# Patient Record
Sex: FEMALE | Race: WHITE
[De-identification: names, ages, dates, MRNs, and addresses within clinical notes are randomized per-mention and may not be internally consistent; named-entity substitution may affect disease eponyms.]

---

## 2019-02-27 ENCOUNTER — HOSPITAL ENCOUNTER (INPATIENT)
Dept: HOSPITAL 88 - ER | Age: 64
LOS: 5 days | Discharge: HOME | DRG: 603 | End: 2019-03-04
Attending: INTERNAL MEDICINE | Admitting: INTERNAL MEDICINE
Payer: COMMERCIAL

## 2019-02-27 VITALS — HEIGHT: 64 IN | BODY MASS INDEX: 50.02 KG/M2 | WEIGHT: 293 LBS

## 2019-02-27 DIAGNOSIS — I87.309: ICD-10-CM

## 2019-02-27 DIAGNOSIS — I89.0: ICD-10-CM

## 2019-02-27 DIAGNOSIS — E78.5: ICD-10-CM

## 2019-02-27 DIAGNOSIS — G57.10: ICD-10-CM

## 2019-02-27 DIAGNOSIS — Z79.82: ICD-10-CM

## 2019-02-27 DIAGNOSIS — L03.115: Primary | ICD-10-CM

## 2019-02-27 DIAGNOSIS — Z79.84: ICD-10-CM

## 2019-02-27 DIAGNOSIS — I25.10: ICD-10-CM

## 2019-02-27 DIAGNOSIS — E03.9: ICD-10-CM

## 2019-02-27 DIAGNOSIS — G47.30: ICD-10-CM

## 2019-02-27 DIAGNOSIS — Z95.1: ICD-10-CM

## 2019-02-27 DIAGNOSIS — E87.1: ICD-10-CM

## 2019-02-27 DIAGNOSIS — N18.3: ICD-10-CM

## 2019-02-27 DIAGNOSIS — I50.32: ICD-10-CM

## 2019-02-27 DIAGNOSIS — E87.6: ICD-10-CM

## 2019-02-27 DIAGNOSIS — I13.0: ICD-10-CM

## 2019-02-27 DIAGNOSIS — E11.22: ICD-10-CM

## 2019-02-27 DIAGNOSIS — N17.9: ICD-10-CM

## 2019-02-27 DIAGNOSIS — N39.0: ICD-10-CM

## 2019-02-27 DIAGNOSIS — E66.01: ICD-10-CM

## 2019-02-27 DIAGNOSIS — D63.1: ICD-10-CM

## 2019-02-27 DIAGNOSIS — R53.81: ICD-10-CM

## 2019-02-27 PROCEDURE — 71045 X-RAY EXAM CHEST 1 VIEW: CPT

## 2019-02-27 PROCEDURE — 81001 URINALYSIS AUTO W/SCOPE: CPT

## 2019-02-27 PROCEDURE — 82728 ASSAY OF FERRITIN: CPT

## 2019-02-27 PROCEDURE — 85610 PROTHROMBIN TIME: CPT

## 2019-02-27 PROCEDURE — 84466 ASSAY OF TRANSFERRIN: CPT

## 2019-02-27 PROCEDURE — 93925 LOWER EXTREMITY STUDY: CPT

## 2019-02-27 PROCEDURE — 85025 COMPLETE CBC W/AUTO DIFF WBC: CPT

## 2019-02-27 PROCEDURE — 83605 ASSAY OF LACTIC ACID: CPT

## 2019-02-27 PROCEDURE — 36415 COLL VENOUS BLD VENIPUNCTURE: CPT

## 2019-02-27 PROCEDURE — 82550 ASSAY OF CK (CPK): CPT

## 2019-02-27 PROCEDURE — 82948 REAGENT STRIP/BLOOD GLUCOSE: CPT

## 2019-02-27 PROCEDURE — 82570 ASSAY OF URINE CREATININE: CPT

## 2019-02-27 PROCEDURE — 84156 ASSAY OF PROTEIN URINE: CPT

## 2019-02-27 PROCEDURE — 93005 ELECTROCARDIOGRAM TRACING: CPT

## 2019-02-27 PROCEDURE — 87040 BLOOD CULTURE FOR BACTERIA: CPT

## 2019-02-27 PROCEDURE — 83735 ASSAY OF MAGNESIUM: CPT

## 2019-02-27 PROCEDURE — 76770 US EXAM ABDO BACK WALL COMP: CPT

## 2019-02-27 PROCEDURE — 80053 COMPREHEN METABOLIC PANEL: CPT

## 2019-02-27 PROCEDURE — 84484 ASSAY OF TROPONIN QUANT: CPT

## 2019-02-27 PROCEDURE — 80048 BASIC METABOLIC PNL TOTAL CA: CPT

## 2019-02-27 PROCEDURE — 93306 TTE W/DOPPLER COMPLETE: CPT

## 2019-02-27 PROCEDURE — 85730 THROMBOPLASTIN TIME PARTIAL: CPT

## 2019-02-27 PROCEDURE — 83880 ASSAY OF NATRIURETIC PEPTIDE: CPT

## 2019-02-27 PROCEDURE — 82553 CREATINE MB FRACTION: CPT

## 2019-02-27 PROCEDURE — 93971 EXTREMITY STUDY: CPT

## 2019-02-27 PROCEDURE — 87086 URINE CULTURE/COLONY COUNT: CPT

## 2019-02-27 PROCEDURE — 99284 EMERGENCY DEPT VISIT MOD MDM: CPT

## 2019-02-27 PROCEDURE — 83540 ASSAY OF IRON: CPT

## 2019-02-27 PROCEDURE — 84443 ASSAY THYROID STIM HORMONE: CPT

## 2019-02-28 VITALS — DIASTOLIC BLOOD PRESSURE: 49 MMHG | SYSTOLIC BLOOD PRESSURE: 114 MMHG

## 2019-02-28 VITALS — DIASTOLIC BLOOD PRESSURE: 51 MMHG | SYSTOLIC BLOOD PRESSURE: 113 MMHG

## 2019-02-28 VITALS — DIASTOLIC BLOOD PRESSURE: 54 MMHG | SYSTOLIC BLOOD PRESSURE: 101 MMHG

## 2019-02-28 VITALS — SYSTOLIC BLOOD PRESSURE: 114 MMHG | DIASTOLIC BLOOD PRESSURE: 49 MMHG

## 2019-02-28 VITALS — SYSTOLIC BLOOD PRESSURE: 127 MMHG | DIASTOLIC BLOOD PRESSURE: 58 MMHG

## 2019-02-28 LAB
ALBUMIN SERPL-MCNC: 3.1 G/DL (ref 3.5–5)
ALBUMIN/GLOB SERPL: 0.9 {RATIO} (ref 0.8–2)
ALP SERPL-CCNC: 23 IU/L (ref 40–150)
ALT SERPL-CCNC: 16 IU/L (ref 0–55)
ANION GAP SERPL CALC-SCNC: 13.1 MMOL/L (ref 8–16)
BACTERIA URNS QL MICRO: (no result) /HPF
BASOPHILS # BLD AUTO: 0 10*3/UL (ref 0–0.1)
BASOPHILS NFR BLD AUTO: 0.2 % (ref 0–1)
BILIRUB UR QL: NEGATIVE
BNP BLD-MCNC: 415.3 PG/ML (ref 0–100)
BUN SERPL-MCNC: 39 MG/DL (ref 7–26)
BUN/CREAT SERPL: 21 (ref 6–25)
CALCIUM SERPL-MCNC: 9.6 MG/DL (ref 8.4–10.2)
CHLORIDE SERPL-SCNC: 97 MMOL/L (ref 98–107)
CK MB SERPL-MCNC: 0.3 NG/ML (ref 0–5)
CK MB SERPL-MCNC: 0.6 NG/ML (ref 0–5)
CK MB SERPL-MCNC: 0.6 NG/ML (ref 0–5)
CK SERPL-CCNC: 146 IU/L (ref 29–168)
CK SERPL-CCNC: 206 IU/L (ref 29–168)
CK SERPL-CCNC: 219 IU/L (ref 29–168)
CLARITY UR: (no result)
CO2 SERPL-SCNC: 23 MMOL/L (ref 22–29)
COLOR UR: YELLOW
CREAT UR-MCNC: 115.84 MG/DL (ref 47–110)
DEPRECATED APTT PLAS QN: 34.8 SECONDS (ref 23.8–35.5)
DEPRECATED INR PLAS: 1.09
DEPRECATED NEUTROPHILS # BLD AUTO: 6.8 10*3/UL (ref 2.1–6.9)
DEPRECATED RBC URNS MANUAL-ACNC: (no result) /HPF (ref 0–5)
EGFRCR SERPLBLD CKD-EPI 2021: 27 ML/MIN (ref 60–?)
EOSINOPHIL # BLD AUTO: 0 10*3/UL (ref 0–0.4)
EOSINOPHIL NFR BLD AUTO: 0.1 % (ref 0–6)
EPI CELLS URNS QL MICRO: (no result) /LPF
ERYTHROCYTE [DISTWIDTH] IN CORD BLOOD: 15.4 % (ref 11.7–14.4)
GLOBULIN PLAS-MCNC: 3.4 G/DL (ref 2.3–3.5)
GLUCOSE SERPLBLD-MCNC: 133 MG/DL (ref 74–118)
HCT VFR BLD AUTO: 24.2 % (ref 34.2–44.1)
HGB BLD-MCNC: 8.1 G/DL (ref 12–16)
HYALINE CASTS #/AREA URNS LPF: (no result) /[LPF] (ref 0–1)
KETONES UR QL STRIP.AUTO: NEGATIVE
LEUKOCYTE ESTERASE UR QL STRIP.AUTO: (no result)
LYMPHOCYTES # BLD: 0.6 10*3/UL (ref 1–3.2)
LYMPHOCYTES NFR BLD AUTO: 6.8 % (ref 18–39.1)
MAGNESIUM SERPL-MCNC: 1.6 MG/DL (ref 1.3–2.1)
MCH RBC QN AUTO: 31.6 PG (ref 28–32)
MCHC RBC AUTO-ENTMCNC: 33.5 G/DL (ref 31–35)
MCV RBC AUTO: 94.5 FL (ref 81–99)
MONOCYTES # BLD AUTO: 0.7 10*3/UL (ref 0.2–0.8)
MONOCYTES NFR BLD AUTO: 8.9 % (ref 4.4–11.3)
MUCOUS THREADS URNS QL MICRO: (no result)
NEUTS SEG NFR BLD AUTO: 83.3 % (ref 38.7–80)
NITRITE UR QL STRIP.AUTO: NEGATIVE
NON-SQ EPI CELLS URNS QL MICRO: (no result)
PLATELET # BLD AUTO: 109 X10E3/UL (ref 140–360)
POTASSIUM SERPL-SCNC: 3.1 MMOL/L (ref 3.5–5.1)
PROT UR QL STRIP.AUTO: NEGATIVE
PROT UR-MCNC: 15.1 MG/DL (ref 1–14)
PROTHROMBIN TIME: 15.1 SECONDS (ref 11.9–14.5)
RBC # BLD AUTO: 2.56 X10E6/UL (ref 3.6–5.1)
RENAL EPI CELLS URNS QL MICRO: (no result)
SODIUM SERPL-SCNC: 130 MMOL/L (ref 136–145)
SP GR UR STRIP: 1.02 (ref 1.01–1.02)
UROBILINOGEN UR STRIP-MCNC: 0.2 MG/DL (ref 0.2–1)

## 2019-02-28 RX ADMIN — FUROSEMIDE SCH MG: 10 INJECTION, SOLUTION INTRAMUSCULAR; INTRAVENOUS at 12:56

## 2019-02-28 RX ADMIN — INSULIN LISPRO SCH UNIT: 100 INJECTION, SOLUTION INTRAVENOUS; SUBCUTANEOUS at 21:00

## 2019-02-28 RX ADMIN — INSULIN LISPRO SCH UNIT: 100 INJECTION, SOLUTION INTRAVENOUS; SUBCUTANEOUS at 16:30

## 2019-02-28 RX ADMIN — CLINDAMYCIN PHOSPHATE SCH MLS/HR: 18 INJECTION, SOLUTION INTRAVENOUS at 09:42

## 2019-02-28 RX ADMIN — INSULIN LISPRO SCH UNIT: 100 INJECTION, SOLUTION INTRAVENOUS; SUBCUTANEOUS at 11:30

## 2019-02-28 RX ADMIN — INSULIN LISPRO SCH UNIT: 100 INJECTION, SOLUTION INTRAVENOUS; SUBCUTANEOUS at 07:30

## 2019-02-28 RX ADMIN — ATORVASTATIN CALCIUM SCH MG: 20 TABLET, FILM COATED ORAL at 21:31

## 2019-02-28 RX ADMIN — CEFTRIAXONE SCH MLS/HR: 100 INJECTION, POWDER, FOR SOLUTION INTRAVENOUS at 01:38

## 2019-02-28 RX ADMIN — CLINDAMYCIN PHOSPHATE SCH MLS/HR: 18 INJECTION, SOLUTION INTRAVENOUS at 02:14

## 2019-02-28 RX ADMIN — CLINDAMYCIN PHOSPHATE SCH MLS/HR: 18 INJECTION, SOLUTION INTRAVENOUS at 17:50

## 2019-02-28 NOTE — XMS REPORT
Patient Summary Document

                             Created on: 2019



[address]

External Reference #: [id number]

: 1955

Sex: Female



Demographics







                          Address                   [address]

[place]  [address]

 

                          Home Phone                [phone]

 

                          Preferred Language        Unknown

 

                          Marital Status            Unknown

 

                          Worship Affiliation     Unknown

 

                          Race                      Unknown

 

                                        Additional Race(s)  

 

                          Ethnic Group              Unknown





Author







                          Author                    [facility]

 

                          Address                   Unknown

 

                          Phone                     Unavailable







Care Team Providers







                    Care Team Member Name    Role                Phone

 

                    SWEET CARLO GUILLEN      Unavailable         Unavailable







Problems

This patient has no known problems.



Allergies, Adverse Reactions, Alerts

This patient has no known allergies or adverse reactions.



Medications

This patient has no known medications.



Results







           Test Description    Test Time    Test Comments    Text Results    Atomic Results    Result

 Comments

 

                CHEST SINGLE (PORTABLE)    2019 00:43:00                                                   

                                                       [place] [address]      Patient Name: M[address]A [address]                     
             MR #: [id number]                     : 1955               
                   Age/Sex: 63/F  Acct #: [id number]                          
   Req #: [id number]  Adm Physician:                                            
         Ordered by: MINDY LARA MD                            Report #: 
[id number]        Location: ER                                      Room/Bed:    
                
___________________________________________________________________________________________________
   Procedure: [id number] DX/CHEST SINGLE (PORTABLE)  Exam Date: 19         
                  Exam Time: 30                                              
REPORT STATUS: Signed    EXAMINATION:  CHEST SINGLE (PORTABLE)         COM
PARISON:  None      INDICATION: Left lower extremity edema     LE EDEMA    
2019        DISCUSSION:   Frontal view of the chest obtained at 0033
hours.      HEART AND MEDIASTINUM:  The heart is top normal in size. Mild aortic
ectasia.    Pulmonary vascular markings are normal.        LINES:  None.      
LUNGS:  The lungs are well inflated and clear. No pneumonia or pulmonary edema. 
    PLEURA:  No pleural effusion or pneumothorax.      BONES AND SOFT TISSUES:  
No focal osseous lesion. The soft tissues are normal.         IMPRESSION:    No 
acute cardiopulmonary disease.      Signed by: Dr. Shae Cagle MD on 
2019 12:43 AM        Dictated By: SHAE CAGLE MD  Electronically 
Signed By: SHAE CAGLE MD on 19  Transcribed By: CAYLA on 
19       COPY TO:   MINDY LARA MD

## 2019-02-28 NOTE — DIAGNOSTIC IMAGING REPORT
EXAMINATION:  CHEST SINGLE (PORTABLE)   



COMPARISON:  None



INDICATION: Left lower extremity edema 

^LE EDEMA

^[id number]

^0030  



DISCUSSION:

Frontal view of the chest obtained at 0033 hours.



HEART AND MEDIASTINUM:  The heart is top normal in size. Mild aortic ectasia. 

Pulmonary vascular markings are normal.

  

LINES:  None.



LUNGS:  The lungs are well inflated and clear. No pneumonia or pulmonary edema.



PLEURA:  No pleural effusion or pneumothorax.



BONES AND SOFT TISSUES:  No focal osseous lesion. The soft tissues are normal.





IMPRESSION: 

No acute cardiopulmonary disease.



Signed by: Dr. Brandyn Cagle MD on 2/28/2019 12:43 AM

## 2019-02-28 NOTE — NUR
PT ARRIVED ON THE UNIT VIA STRETCHER 0207. PT IS A&OX3. RESPIRATION IS EVEN AND UNLABORED, 
NO DISTRESS NOTED. BED IN THE LOWEST POSITION, LOCKED, BED ALARM ON AND CALL LIGHT WITHIN 
REACH. ADMISSION AND HEAD TO TOE ASSESSMENT COMPLETED. WILL CONTINUE TO MONITOR.

## 2019-02-28 NOTE — NUR
PT IS RESTING IN BED WITH FAMILY AT BEDSIDE. NO RESPIRATORY DISTRESS NOTED. BED IN THE 
LOWEST POSITION, LOCKED, BED ALARM ON,  AND CALL LIGHT WITHIN REACH. WILL CONTINUE TO 
MONITOR.

## 2019-02-28 NOTE — HISTORY AND PHYSICAL
CHIEF COMPLAINT:  Leg swelling, leg edema, and redness.

 

HISTORY OF PRESENT ILLNESS:  Ms. Pelletier is a 63-year-old-female.  She presented to the

emergency room with worsening leg swelling, edema, and redness with blistering.  She

denies any complaints of chest pain, nausea, or vomiting.  Symptoms have been going on

for the last few months to a year, but progressively getting worse.  She denies any

history of heart disease.  She has history of diabetes and hypertension.  She sees a

primary care physician.  She works as a  at [employer]. 

 

REVIEW OF SYSTEMS:

GENERAL:  Denies any fever or chills. 

HEAD:  Denies any head trauma. 

ENT:  Denies any earaches. 

CVS:  Denies any chest pain. 

RESPIRATORY:  Denies any shortness of breath. 

GI:  Denies any nausea or vomiting. 

 

The rest of the review of systems are negative except as in the HPI.

 

PAST MEDICAL HISTORY:  Hypertension, hyperlipidemia, diabetes, obesity.

 

PAST SURGICAL HISTORY:  None.

 

FAMILY AND SOCIAL HISTORY:  She does not smoke.  Does not drink.  Works as a 

at [employer]. 

 

PHYSICAL EXAMINATION:

VITAL SIGNS:  Temperature 97.0, pulse of 70, blood pressure 101/54, respiratory rate of

18. 

HEENT:  Atraumatic and normocephalic. 

NECK:  Supple. 

CHEST:  Clear to auscultation bilaterally.  No wheezing. 

HEART:  S1 and S2 audible. 

ABDOMEN:  Soft. 

EXTREMITIES:  Bilateral pedal edema, blistering and redness.

LABS:  Sodium 130, potassium 3.1, chloride 97, BUN 39, creatinine 1.89.  White count of

8.1, hemoglobin 8.1.  Venous Doppler preliminary report shows no DVT.  Chest x-ray, I

have reviewed the images myself and it is not showing any acute cardiopulmonary disease. 

 

ASSESSMENT AND PLAN:  Ms. Pelletier is a 63-year-old-female.  She presented to the

emergency room with leg edema, swelling, redness, and warmth. 

1. Cellulitis of the lower extremity.

2. Chronic lower extremity edema, lymphedema, versus venous stasis versus congestive

heart failure. 

3. Hypertension.

4. Obesity.

5. Diabetes.

 

PLAN:  

1. Continue the patient on IV clindamycin and Rocephin.  I will consult Infectious

Disease for ID recommendation. 

2. Consult Dr. Diop for management of venous stasis, edema, and ulcers.

3. Consult Nephrology.  The patient has acute care.  The patient probably has CKD and

creatinine is 1.89.  I will consult Nephrology for further recommendation. 

4. Consult Cardiology for evaluation for heart failure.  A preliminary echo is not

showing 

evidence of heart failure.  BNP was 415 on admission.

5. Repeat labs.

 

 

 

 

______________________________

MD ART Johns/CHRISTEN

D:  02/28/2019 10:37:08

T:  02/28/2019 17:35:29

Job #:  [id number]

## 2019-02-28 NOTE — NUR
Nutrition Screen Note



RD Recommendation for Physician: 

-Continue ADA diet as ordered



Plan of Care: RD following, monitoring for tolerance and adequacy



Nutrition reason for involvement: Nutrition Risk Trigger  MST 



Primary Diagnose(s): anemia, cellulitis, LE edema, renal insufficiency, UTI 



PMH: HLD, DM  



Ht: 64in 

Wt: 300.44lb

BMI: 51.6kg/m2

IBW: 120lb



RD Assessment:

(2/28) Chart reviewed. Labs and meds reviewed. 62yo F, who was admitted for LE cellulitis 
and edema. Currently on abx and Lasix. Visited pt in room who denied significant wt loss, 
denied decrease in appetite PTA. Pt denied chewing/swallowing problems and nausea/vomiting. 
RN recorded ~% meal intake since admission. Will continue to monitor and follow. 



Current Diet: ADA diet 



Malnutrition Evaluation (2/28)

The patient does not meet criteria for a specified degree of malnutrition at this time. Will 
re-evaluate at follow-up as appropriate. 



Diet Education Needs Assessment:

Diet education not indicated.



Nutrition Care Level: low 





Signed: Sabra Sandhu, MS, RD, LD

## 2019-02-28 NOTE — CONSULTATION
DATE OF CONSULTATION:  02/28/2019  

 

REASON FOR CONSULTATION:  Questionable CHF.

 

CHIEF COMPLAINT:  Fevers, chills, right lower extremity swelling, and redness.

 

HISTORY OF PRESENT ILLNESS:  This is a 63-year-old female with history of 
hypertension,

diabetes, hyperlipidemia, hypothyroidism, and chronic lymphedema.  The patient 
presents

to [facility] ER with complaints of lower extremity swelling, 
redness,

fevers, and chills that started about Monday; however, fevers progressed on 
Thursday,

therefore came to the ER for further evaluation.  BNP was noted to be about 415,
so

Cardiology was consulted to evaluate heart function and structure.  The patient 
is seen

in room, in no acute distress.  Reports as above fevers, chills, and right lower

extremity swelling since Monday; however, on Thursday noted some fevers, had 
progressed

and therefore came for further evaluation.  The patient denies any shortness of 
breath,

any chest pain, any orthopnea, or PND. 

 

PAST MEDICAL HISTORY:  Hypertension, diabetes, hyperlipidemia, hypothyroidism, 
and

chronic lymphedema. 

 

PAST SURGICAL HISTORY:  Denies any surgeries.

 

SOCIAL HISTORY:  She is .  She is  at [employer].  She

denies any alcohol use or any tobacco use. 

 

FAMILY HISTORY:  Mother is alive, apparently has history of anemia.  Father, he 
is

alive, apparently has history of CAD status post CABG. 

 

HOME MEDICATIONS:  Include atorvastatin 20 mg once a day, 
lisinopril/hydrochlorothiazide

5/6.25 once a day, fenofibrate 160 mg daily, Lasix 40 mg daily, levothyroxine 
175 mcg

p.o. daily, losartan 100 mg daily, and metformin 1000 mg daily, 

 

ALLERGIES:  NO KNOWN ALLERGIES.

 

REVIEW OF SYSTEMS:

GENERAL:  Denies any weight changes.  Positive for fevers, chills, and night 
sweats. 

SKIN:  Positive for right lower extremity swelling, redness, and weeping. 

HEENT:  Denies any nausea or vomiting.  Does have some left eye blurriness, 
which is

chronic.  Denies any earaches, any tinnitus, any epistaxis, any sore throat, 
hoarseness,

or swollen neck. 

CARDIAC:  Denies any chest pain.  Positive for dyspnea on exertion.  Denies any

orthopnea or any PND.  Positive for lower extremity edema. 

RESPIRATORY:  Denies any shortness of breath.  However, positive for dyspnea on

exertion.  Denies any wheezing, coughing, any hemoptysis. 

GI:  Reports good appetite.  Denies any nausea or vomiting, any bleeding, 
hematochezias,

melena, hemoptysis. 

URINARY:  Positive for frequency and urgency.  Denies any hematuria, nocturia, 
or

dysuria. 

VASCULAR:  Positive for lower extremity edema.   

MUSCULOSKELETAL:  Positive for back pains and joint pains in general. 

NEUROLOGIC:  Denies any numbness, tingling, tremors, weakness, paralysis, 
blackout,

seizures. 

HEMATOLOGIC:  Denies any anemia or easy bruising. 

ENDOCRINE:  Positive for cold intolerance.  Denies any polyuria, polydipsia, or

polyphagia. 

 

PHYSICAL EXAMINATION:

VITAL SIGNS:  Height 64 inches, 300 pounds, and BMI 51.  Temperature 97.0, pulse
70,

respiratory rate 16, blood pressure 101/54, pulse ox 95% on room air.  Of note, 
on

admission, temperature 99.2. 

GENERAL:  Appears stated age, in no acute distress. 

SKIN:  Both legs with swelling; however, right with more pronounced redness, 
weeping,

erythema, and warmth. 

HEENT:  Normocephalic.  Pupils are equal and reactive.  Extraocular movements 
are

intact.  Oral mucosa pink. 

NECK:  Trachea midline.  No JVD.  No thyromegaly. 

HEART:  Regular rate and rhythm.  PMI about fifth intercostal space. 

LUNGS:  Bilateral breath sounds.  Clear to auscultation.   

ABDOMEN:  Soft, nontender, nondistended.  No organomegaly noted. 

VASCULAR:  +2 bilateral radial pulses, +1 DP/PT pulses bilaterally.  Lower 
extremity

edema and swelling, right greater than left; right with redness, erythema, and 
weeping. 

NEUROLOGIC:  Cranial nerves II through XII seem intact.

LABORATORY DATA:  White count 8, hemoglobin 8.1, hematocrit 24, and platelets 
109.

Chemistry; sodium 130, potassium 3.1, chloride 97, BUN 39, creatinine 1.8.  
Troponins

0.002, next is 0.02.  .  TSH is pending. 

 

IMAGING:  Chest x-ray, no acute cardiopulmonary disease.  EKG, sinus rhythm with

incomplete right bundle-branch block. 

 

ASSESSMENT:  

1. Cellulitis, right leg.

2. Chronic lymphedema.

3. Hypertension.

4. Acute kidney injury.

5. Hyperlipidemia.

6. Hypothyroidism.

7. Hypokalemia.

8. Morbid obesity.

 

PLAN:  

1. The patient presents with right lower extremity redness, swelling with 
fevers,

chills, being treated for cellulitis in a patient with chronic lymphedema.  We 
will go

ahead and get echo to evaluate heart function and structure. 

2. We will get a venous Doppler to evaluate for any clot burden.

3. We will go ahead and hold her antihypertensives since her blood pressure is 
marginal

and add therapy as blood pressure indicates. 

4. Renal is on board.  IV fluids will be stopped and Lasix will be initiated as 
per

Renal's recommendations. 

5. We will continue to monitor the patient and adjust cardiac therapy as 
clinical course

dictates. 

 

 

Dictated by Edgar Vaughn NP

 SEEN AND EXAMINED

AGREE WITH NOTE

______________________________

Mark Levy MD DC/CHRISTEN

D:  02/28/2019 11:38:41

T:  02/28/2019 18:13:59

Job #:  [id number]

 

[email or web address]

## 2019-02-28 NOTE — CONSULTATION
DATE OF CONSULTATION:  02/28/2019

 

Renal Consultation 

 

REASON FOR CONSULTATION:  Renal failure.

 

HISTORY OF PRESENT ILLNESS:  A 63-year-old female with history of diabetes,

hypertension, presented to [facility] for lower extremity

swelling that had worsened.  The patient states that her primary care doctor mentioned

that she had elevated kidney labs that he was going to watch for sometime.  She never

saw nephrologist, and she has had lower extremity swelling for years.  The patient

however states that she developed pain especially in her right lower extremity with

redness, weeping fluid as well as fever and presented to the emergency room.  The

patient was found to have cellulitis, was admitted, started on antibiotics.  She was

noted to have elevated BUN and creatinine and Nephrology consultation was called.  The

patient states she took Aleve for her fever, but in general does not take any

over-the-counter medications. 

 

REVIEW OF SYSTEMS:

As above.  All other systems negative.

 

PAST MEDICAL HISTORY:  

1. Diabetes type 2 for at least 4 years.

2. Hypothyroid.

3. Hypertension.

4. Dyslipidemia.

5. Chronic lower extremity swelling.

 

PAST SURGICAL HISTORY:  None.

 

SOCIAL HISTORY:  No tobacco, no alcohol, no IV drugs.

 

FAMILY HISTORY:  No family history of kidney disease.

 

ALLERGIES:  NO KNOWN DRUG ALLERGIES.

 

CURRENT MEDICATIONS:  Normal saline, clindamycin, ceftriaxone, losartan.  Received

potassium chloride. 

 

PHYSICAL EXAMINATION:

VITAL SIGNS:  Blood pressure 101/54, pulse 70, respiratory rate 16, and temperature 97. 

GENERAL:  In no apparent distress. 

HEENT:  Oropharynx clear.  No scleral icterus.  No periorbital edema. 

NECK:  Supple.  Unable to assess jugular venous pressure due to body habitus. 

CHEST:  Clear to auscultation anteriorly with decreased breath sounds at basis. 

CARDIOVASCULAR:  Regular rhythm.  No murmurs or rubs. 

ABDOMEN:  Soft.  Positive bowel sounds.  No tenderness.  No rebound. 

EXTREMITIES:  2+ pitting edema with erythema right greater than left, some weeping. 

SKIN:  Erythema, positive warmth, and weeping as above.

IMAGING:  Chest x-ray clear.

 

LABORATORY DATA:  White count 8.19, hemoglobin 8.1, hematocrit 24.2, and platelets 109.

Sodium 130, potassium 3.1, chloride 97, BUN 39, and creatinine 1.89.  Estimated GFR 27

mL per minute.  Glucose 133, .  Urine, 6-10 rbc's, 11-20 wbc's, some transition

epithelial cells, renal epithelial cells, many bacteria, 2-5 hyaline casts and cloudy

urine, no protein. 

 

ASSESSMENT AND PLAN:  

1. Acute kidney injury, on stage 3 chronic kidney disease.  Suspect the patient has

underlying stage 3 chronic kidney disease due to diabetes.  Suspect her acute kidney

disease is due to her __________ infection.  We will continue with antibiotics,

discontinue IV fluids.  We will give gentle Lasix.  We will check renal ultrasound,

urine studies.  Depending on these tests, we will do further testing as necessary.

Avoid all nephrotoxic medications especially NSAIDs. 

2. Volume overload on an exam, discontinue IV fluids and Lasix to be started as above.

3. Hyponatremia, we will place the patient on fluid restriction.

4. Hypokalemia, replaced.  We will check again in the morning.

5. Cellulitis, dose all antibiotics for estimated GFR 27 mL per minute, follow urine

culture. 

6. Diabetes per primary team.

7. Hypertension, continue Losartan.

 

 

 

 

______________________________

MD NESTOR Rosales/CHRISTEN

D:  02/28/2019 11:19:07

T:  02/28/2019 18:00:20

Job #:  [id number]

## 2019-02-28 NOTE — DIAGNOSTIC IMAGING REPORT
EXAM: Renal Ultrasound



INDICATION:   ALEKSANDR on CKD. 



COMPARISON: None 



TECHNIQUE: Transverse and longitudinal images of the kidneys and bladder were

obtained.  



FINDINGS:     



Right Kidney: 

Length: Measures 11.0 x 5.0 x 5.5 cm

Appearance: Normal echogenicity.  

Collecting system: No hydronephrosis

Stones: None

Cyst/Mass: None



Left Kidney: 

Length: Measures 12.7 x 5.6 x 4.6 cm

Appearance: Normal echogenicity.  

Collecting system: No hydronephrosis

Stones: None

Cyst/Mass: None



Bladder: 

The bladder is compressed.



IMPRESSION:

Unremarkable renal ultrasound.



Signed by: Dr. Marla Wayne MD on 2/28/2019 3:31 PM

## 2019-03-01 VITALS — SYSTOLIC BLOOD PRESSURE: 117 MMHG | DIASTOLIC BLOOD PRESSURE: 56 MMHG

## 2019-03-01 VITALS — SYSTOLIC BLOOD PRESSURE: 106 MMHG | DIASTOLIC BLOOD PRESSURE: 47 MMHG

## 2019-03-01 VITALS — DIASTOLIC BLOOD PRESSURE: 40 MMHG | SYSTOLIC BLOOD PRESSURE: 106 MMHG

## 2019-03-01 VITALS — DIASTOLIC BLOOD PRESSURE: 55 MMHG | SYSTOLIC BLOOD PRESSURE: 108 MMHG

## 2019-03-01 VITALS — SYSTOLIC BLOOD PRESSURE: 105 MMHG | DIASTOLIC BLOOD PRESSURE: 54 MMHG

## 2019-03-01 VITALS — SYSTOLIC BLOOD PRESSURE: 111 MMHG | DIASTOLIC BLOOD PRESSURE: 56 MMHG

## 2019-03-01 LAB
ALBUMIN SERPL-MCNC: 2.8 G/DL (ref 3.5–5)
ALBUMIN/GLOB SERPL: 0.8 {RATIO} (ref 0.8–2)
ALP SERPL-CCNC: 24 IU/L (ref 40–150)
ALT SERPL-CCNC: 13 IU/L (ref 0–55)
ANION GAP SERPL CALC-SCNC: 10.2 MMOL/L (ref 8–16)
BASOPHILS # BLD AUTO: 0 10*3/UL (ref 0–0.1)
BASOPHILS NFR BLD AUTO: 0.7 % (ref 0–1)
BUN SERPL-MCNC: 34 MG/DL (ref 7–26)
BUN/CREAT SERPL: 20 (ref 6–25)
CALCIUM SERPL-MCNC: 9.2 MG/DL (ref 8.4–10.2)
CHLORIDE SERPL-SCNC: 103 MMOL/L (ref 98–107)
CO2 SERPL-SCNC: 25 MMOL/L (ref 22–29)
DEPRECATED NEUTROPHILS # BLD AUTO: 2.7 10*3/UL (ref 2.1–6.9)
EGFRCR SERPLBLD CKD-EPI 2021: 30 ML/MIN (ref 60–?)
EOSINOPHIL # BLD AUTO: 0.2 10*3/UL (ref 0–0.4)
EOSINOPHIL NFR BLD AUTO: 3.3 % (ref 0–6)
ERYTHROCYTE [DISTWIDTH] IN CORD BLOOD: 15.2 % (ref 11.7–14.4)
FERRITIN SERPL-MCNC: 235.85 NG/ML (ref 4.63–204)
GLOBULIN PLAS-MCNC: 3.7 G/DL (ref 2.3–3.5)
GLUCOSE SERPLBLD-MCNC: 91 MG/DL (ref 74–118)
HCT VFR BLD AUTO: 26.4 % (ref 34.2–44.1)
HGB BLD-MCNC: 8.6 G/DL (ref 12–16)
IRON SATN MFR SERPL: 8 % (ref 15–50)
IRON SERPL-MCNC: 29 UG/DL (ref 50–170)
LYMPHOCYTES # BLD: 0.9 10*3/UL (ref 1–3.2)
LYMPHOCYTES NFR BLD AUTO: 19 % (ref 18–39.1)
MAGNESIUM SERPL-MCNC: 1.8 MG/DL (ref 1.3–2.1)
MCH RBC QN AUTO: 31 PG (ref 28–32)
MCHC RBC AUTO-ENTMCNC: 32.6 G/DL (ref 31–35)
MCV RBC AUTO: 95.3 FL (ref 81–99)
MONOCYTES # BLD AUTO: 0.7 10*3/UL (ref 0.2–0.8)
MONOCYTES NFR BLD AUTO: 15.6 % (ref 4.4–11.3)
NEUTS SEG NFR BLD AUTO: 61 % (ref 38.7–80)
PLATELET # BLD AUTO: 118 X10E3/UL (ref 140–360)
POTASSIUM SERPL-SCNC: 3.2 MMOL/L (ref 3.5–5.1)
RBC # BLD AUTO: 2.77 X10E6/UL (ref 3.6–5.1)
SODIUM SERPL-SCNC: 135 MMOL/L (ref 136–145)
TIBC SERPL-MCNC: 363 UG/DL (ref 261–478)
TRANSFERRIN SERPL-MCNC: 259 MG/DL (ref 180–382)

## 2019-03-01 RX ADMIN — CLINDAMYCIN PHOSPHATE SCH MLS/HR: 18 INJECTION, SOLUTION INTRAVENOUS at 01:31

## 2019-03-01 RX ADMIN — INSULIN LISPRO SCH UNIT: 100 INJECTION, SOLUTION INTRAVENOUS; SUBCUTANEOUS at 20:11

## 2019-03-01 RX ADMIN — INSULIN LISPRO SCH UNIT: 100 INJECTION, SOLUTION INTRAVENOUS; SUBCUTANEOUS at 07:30

## 2019-03-01 RX ADMIN — CLINDAMYCIN PHOSPHATE SCH MLS/HR: 18 INJECTION, SOLUTION INTRAVENOUS at 10:00

## 2019-03-01 RX ADMIN — CEFTRIAXONE SCH MLS/HR: 100 INJECTION, POWDER, FOR SOLUTION INTRAVENOUS at 13:00

## 2019-03-01 RX ADMIN — CLINDAMYCIN PHOSPHATE SCH MLS/HR: 18 INJECTION, SOLUTION INTRAVENOUS at 18:37

## 2019-03-01 RX ADMIN — ATORVASTATIN CALCIUM SCH MG: 20 TABLET, FILM COATED ORAL at 20:11

## 2019-03-01 RX ADMIN — LOSARTAN POTASSIUM SCH MG: 100 TABLET, FILM COATED ORAL at 08:29

## 2019-03-01 RX ADMIN — CEFTRIAXONE SCH MLS/HR: 100 INJECTION, POWDER, FOR SOLUTION INTRAVENOUS at 00:52

## 2019-03-01 RX ADMIN — FUROSEMIDE SCH MG: 10 INJECTION, SOLUTION INTRAMUSCULAR; INTRAVENOUS at 08:29

## 2019-03-01 RX ADMIN — INSULIN LISPRO SCH UNIT: 100 INJECTION, SOLUTION INTRAVENOUS; SUBCUTANEOUS at 16:30

## 2019-03-01 RX ADMIN — INSULIN LISPRO SCH UNIT: 100 INJECTION, SOLUTION INTRAVENOUS; SUBCUTANEOUS at 11:30

## 2019-03-01 NOTE — CONSULTATION
DATE OF CONSULTATION:  

 

Wound Consultation

 

Thank you Dr. Luz for asking us to see this patient.

 

HISTORY OF PRESENT ILLNESS:  A 63-year-old morbidly obese female patient with chronic

leg edema, admitted with multiple ulcerations to both legs, worse on the right leg with

cellulitis.  Wound consult was called. 

 

PAST MEDICAL HISTORY:  Diabetes mellitus, morbid obesity, hypertension, hypothyroidism.

 

PERSONAL HISTORY:  No history of smoking or alcohol.

 

MEDICATIONS:  Lasix 20 mg daily, atorvastatin 20 mg, losartan 100 mg daily, ceftriaxone,

clindamycin, insulin. 

 

REVIEW OF SYSTEMS:

All other systems reviewed, no complaints reported.

 

PHYSICAL EXAMINATION:

VITAL SIGNS:  Blood pressure 101/54, pulse 70, temperature 97. 

HEENT:  Normal. 

NECK:  No JVD. 

LUNGS:  Clear. 

ABDOMEN:  Protuberant, abdominal wall edema present. 

EXTREMITIES:  Bilateral lower extremity 3+ edema present with redness and multiple

punched out ulcerations to right leg and right posterior leg, there is one wound with

full thickness with slough measuring 1.5 x 1.5 cm and partial thickness wounds on the

left leg.  Lymphedema present.  Lymphoceles present.  Fluid weeping through the right

leg. 

ASSESSMENT:  Bilateral chronic leg edema with cellulitis, edema from obesity, venous

hypertension, and possible sleep apnea. 

 

PLAN:  We will apply adaptic 4 x 4, Kerlix, and Coban to both legs. 

 

Thank you Dr. Luz for asking us to see this patient.  I will follow with you.

 

 

 

 

______________________________

MD ETHAN Lynn/MODL

D:  03/01/2019 11:41:44

T:  03/01/2019 18:51:00

Job #:  [id number]

## 2019-03-01 NOTE — NUR
WOUND CARE CONSULTATION - INITIAL EVALUATION



Patient admitted to ER from Home for BLE swelling worsening with more pronounced swelling to 
RLE. 

DX: Cellulitis BLE. 

HX: DM, HTN, Hypothyroidism, Lymphedema, Chronic BLE Swelling. 

Dr. Diop on case managing WC needs.



LABS:

WBC4.48

HGB8.6

HCT26.4

NEUT%61

GLU91



Extremity Venous Study - Negative

Patient on IV ABX



PATIENT VISIT:

- Pleasant 62 y/o F in bed resting with legs elevated.

- Family members at bedside.

- Osmar Score 17

- Moderate PUP Active

- Alternating Pressure Air Mattress

- Ambulates with Walker. 

- Able to turn with min assistance. 

- No pressure ulcers identified.

- BLE presents with multiple open ulcers with largest being 0.3x0.2x0.1 cm weeping serous 
fluid. 

- Edema +2 pitting BLE. RLE visibly more swollen than Left. 



IMPRESSION:

- BLE Lymphedema with Venous Stasis Ulcers.



RECOMMENDATION: Continued care per Dr. Diop : Wash BLE With Hibliclens Soap Daily- Apply 
Adaptec over open areas , Wrap with Kerlx and & Coban Wraps Daily. 



**Consider** A&B for BLE Venous Stasis Ulcers with Lymphedema:

A.   - Wash BLE with Hibiclens Soap and Water Daily then apply single layer of Xeroform and 
Cover with ABD Pad over weeping areas, Wrap with Kerlix and Coban Every Other Day.

B.  - Wash BLE with Hibiclens Soap and Water Daily then apply Adaptec + Maxsorb Ag+ over 
open areas then Wrap with Kerlix and Coban Every Other Day.



Thank you for consulting with Wound Care. 

-------------------------------------------------------------------------------

Addendum: 03/01/19 at 1247 by Romel Bledsoe RN

-------------------------------------------------------------------------------

Amended: Links added.

-------------------------------------------------------------------------------

Addendum: 03/01/19 at 1324 by Romel Bledsoe RN

-------------------------------------------------------------------------------

Dressing change applied as prescribed.

## 2019-03-01 NOTE — NUR
patient received awake, alert, sitting up in chair. bilateral legs elevated. no c/o pain 
noted. pm assessment complete. family noted in room with patient. patient instructed to call 
for assistance when needed.

## 2019-03-01 NOTE — NUR
CASE MANAGEMENT INITIAL ASSESSMENT 

 to bedside to discuss plan of care with patient/family. CM/SW role and care 
transitions discussed. Anticipated discharge plan discussed along with duration of care. 
CM/SW discussed patients right to make decisions in care.  CM/SW work hours given.  

Patient lives: WITH HER MOTHER IN A 1 STORY HOME

Admit/Transfer: ER

Hospital/ER visits since last admit:  NONE

POA/Emergency contact: CAMELIA JOYNER / DTKAYLIE @ [phone]

Current/Previous Home Health: NONE

PCP/Follow-up Care: ALONDRA ANGELES

Current/Previous DME: [employer], USES HER MOTHER'S WALKER, BARS IN SHOWER

Other Services: NONE

Employment Status: EMPLOYED @ [employer]

Areas of Concerns: LEGS HEALING

Referral Needs: NEEDS WALKER / ROLLATOR

Education Needs: CARE FOR LEGS

IMM/MOON given and signed (if applicable): DONE AT ADM

Goal for discharge: RETURN HOME SAFELY 

CM/SW left business card at the bedside with contact information. Name and number was also 
written on the patients whiteboard. Patient verbalized understanding of discussion. CM will 
follow-up with ongoing discharge and transition of care needs.

## 2019-03-02 VITALS — DIASTOLIC BLOOD PRESSURE: 51 MMHG | SYSTOLIC BLOOD PRESSURE: 97 MMHG

## 2019-03-02 VITALS — SYSTOLIC BLOOD PRESSURE: 105 MMHG | DIASTOLIC BLOOD PRESSURE: 78 MMHG

## 2019-03-02 VITALS — DIASTOLIC BLOOD PRESSURE: 57 MMHG | SYSTOLIC BLOOD PRESSURE: 113 MMHG

## 2019-03-02 VITALS — SYSTOLIC BLOOD PRESSURE: 133 MMHG | DIASTOLIC BLOOD PRESSURE: 70 MMHG

## 2019-03-02 VITALS — DIASTOLIC BLOOD PRESSURE: 59 MMHG | SYSTOLIC BLOOD PRESSURE: 128 MMHG

## 2019-03-02 VITALS — SYSTOLIC BLOOD PRESSURE: 105 MMHG | DIASTOLIC BLOOD PRESSURE: 49 MMHG

## 2019-03-02 VITALS — DIASTOLIC BLOOD PRESSURE: 57 MMHG | SYSTOLIC BLOOD PRESSURE: 121 MMHG

## 2019-03-02 LAB
ANION GAP SERPL CALC-SCNC: 13.6 MMOL/L (ref 8–16)
BASOPHILS # BLD AUTO: 0.1 10*3/UL (ref 0–0.1)
BASOPHILS NFR BLD AUTO: 1.3 % (ref 0–1)
BUN SERPL-MCNC: 27 MG/DL (ref 7–26)
BUN/CREAT SERPL: 18 (ref 6–25)
CALCIUM SERPL-MCNC: 9.7 MG/DL (ref 8.4–10.2)
CHLORIDE SERPL-SCNC: 104 MMOL/L (ref 98–107)
CO2 SERPL-SCNC: 23 MMOL/L (ref 22–29)
DEPRECATED NEUTROPHILS # BLD AUTO: 1.9 10*3/UL (ref 2.1–6.9)
EGFRCR SERPLBLD CKD-EPI 2021: 34 ML/MIN (ref 60–?)
EOSINOPHIL # BLD AUTO: 0.3 10*3/UL (ref 0–0.4)
EOSINOPHIL NFR BLD AUTO: 7.5 % (ref 0–6)
ERYTHROCYTE [DISTWIDTH] IN CORD BLOOD: 15.5 % (ref 11.7–14.4)
GLUCOSE SERPLBLD-MCNC: 85 MG/DL (ref 74–118)
HCT VFR BLD AUTO: 29.2 % (ref 34.2–44.1)
HGB BLD-MCNC: 9.1 G/DL (ref 12–16)
LYMPHOCYTES # BLD: 0.9 10*3/UL (ref 1–3.2)
LYMPHOCYTES NFR BLD AUTO: 23.6 % (ref 18–39.1)
MCH RBC QN AUTO: 30.5 PG (ref 28–32)
MCHC RBC AUTO-ENTMCNC: 31.2 G/DL (ref 31–35)
MCV RBC AUTO: 98 FL (ref 81–99)
MONOCYTES # BLD AUTO: 0.6 10*3/UL (ref 0.2–0.8)
MONOCYTES NFR BLD AUTO: 15 % (ref 4.4–11.3)
NEUTS SEG NFR BLD AUTO: 52.1 % (ref 38.7–80)
PLATELET # BLD AUTO: 140 X10E3/UL (ref 140–360)
POTASSIUM SERPL-SCNC: 3.6 MMOL/L (ref 3.5–5.1)
RBC # BLD AUTO: 2.98 X10E6/UL (ref 3.6–5.1)
SODIUM SERPL-SCNC: 137 MMOL/L (ref 136–145)

## 2019-03-02 RX ADMIN — LOSARTAN POTASSIUM SCH MG: 100 TABLET, FILM COATED ORAL at 08:34

## 2019-03-02 RX ADMIN — CEFTRIAXONE SCH MLS/HR: 100 INJECTION, POWDER, FOR SOLUTION INTRAVENOUS at 13:01

## 2019-03-02 RX ADMIN — TRIAMCINOLONE ACETONIDE SCH GM: 1 CREAM TOPICAL at 08:34

## 2019-03-02 RX ADMIN — ATORVASTATIN CALCIUM SCH MG: 20 TABLET, FILM COATED ORAL at 20:28

## 2019-03-02 RX ADMIN — LEVOTHYROXINE SODIUM SCH MCG: 125 TABLET ORAL at 05:24

## 2019-03-02 RX ADMIN — INSULIN LISPRO SCH UNIT: 100 INJECTION, SOLUTION INTRAVENOUS; SUBCUTANEOUS at 16:30

## 2019-03-02 RX ADMIN — FENOFIBRATE SCH MG: 145 TABLET ORAL at 08:34

## 2019-03-02 RX ADMIN — PIOGLITAZONE SCH MG: 15 TABLET ORAL at 08:33

## 2019-03-02 RX ADMIN — METFORMIN HYDROCHLORIDE SCH MG: 500 TABLET, EXTENDED RELEASE ORAL at 08:34

## 2019-03-02 RX ADMIN — BICTEGRAVIR SODIUM, EMTRICITABINE, AND TENOFOVIR ALAFENAMIDE FUMARATE SCH: 50; 200; 25 TABLET ORAL at 09:00

## 2019-03-02 RX ADMIN — CLINDAMYCIN PHOSPHATE SCH MLS/HR: 18 INJECTION, SOLUTION INTRAVENOUS at 18:00

## 2019-03-02 RX ADMIN — CEFTRIAXONE SCH MLS/HR: 100 INJECTION, POWDER, FOR SOLUTION INTRAVENOUS at 00:11

## 2019-03-02 RX ADMIN — INSULIN LISPRO SCH UNIT: 100 INJECTION, SOLUTION INTRAVENOUS; SUBCUTANEOUS at 21:00

## 2019-03-02 RX ADMIN — ENOXAPARIN SODIUM SCH MG: 60 INJECTION SUBCUTANEOUS at 08:33

## 2019-03-02 RX ADMIN — ENOXAPARIN SODIUM SCH MG: 60 INJECTION SUBCUTANEOUS at 17:00

## 2019-03-02 RX ADMIN — LEVOTHYROXINE SODIUM SCH MCG: 50 TABLET ORAL at 05:24

## 2019-03-02 RX ADMIN — INSULIN LISPRO SCH UNIT: 100 INJECTION, SOLUTION INTRAVENOUS; SUBCUTANEOUS at 07:30

## 2019-03-02 RX ADMIN — INSULIN LISPRO SCH UNIT: 100 INJECTION, SOLUTION INTRAVENOUS; SUBCUTANEOUS at 11:30

## 2019-03-02 RX ADMIN — CLINDAMYCIN PHOSPHATE SCH MLS/HR: 18 INJECTION, SOLUTION INTRAVENOUS at 10:14

## 2019-03-02 RX ADMIN — FUROSEMIDE SCH MG: 10 INJECTION, SOLUTION INTRAMUSCULAR; INTRAVENOUS at 08:33

## 2019-03-02 RX ADMIN — CLINDAMYCIN PHOSPHATE SCH MLS/HR: 18 INJECTION, SOLUTION INTRAVENOUS at 01:12

## 2019-03-02 NOTE — NUR
adaptic dressings applied to open areas to bilateral lower extremities, kerlex wraps applied 
then coban wraps applied per orders.

## 2019-03-02 NOTE — NUR
dressings to bilateral legs removed. patient in shower with assistance. bilateral legs 
allowed to dry before applying new dressings.

## 2019-03-02 NOTE — CONSULTATION
DATE OF CONSULTATION:  02/28/2019  

 

I would like to thank Dr. Luz for this interesting consult.

 

HISTORY OF PRESENT ILLNESS:  This is a very pleasant 63-year-old female with past

medical history of hypertension, obesity, hyperlipidemia, diabetes mellitus and

hypothyroidism, who was initially admitted with concerns of fever.  The patient had

swelling of the bilateral lower extremities, right more than left with erythema, edema,

and drainage from the leg.  The patient has not been diagnosed with any vascular illness

or lymphedema before.  She has not been taking any antibiotics.  So, for the concerns of

swelling and weeping, the patient came to the hospital and was admitted.  She has been

started on clindamycin and Rocephin.  Urinalysis shows pyuria and bacteruria.  Our

service has been asked to evaluate and give further recommendations. 

 

PAST MEDICAL HISTORY:  Hypertension, hyperlipidemia, and diabetes mellitus.

 

PAST SURGICAL HISTORY:  Not significant.

 

MEDICATIONS:  Reviewed.

 

ALLERGIES:  NO KNOWN DRUG ALLERGIES.

 

FAMILY HISTORY:  Noncontributory to diabetes mellitus and hypertension.

 

SOCIAL HISTORY:  The patient lives at home with family.  No history of smoking or

alcohol intake. 

 

PHYSICAL EXAMINATION:

VITAL SIGNS:  Temperature is 98.6, respiratory rate is 18, heart rate is 70, blood

pressure is 106/47. 

CHEST:  Clear to auscultation bilaterally. 

HEART:  S1, S2 normal. 

ABDOMEN:  Soft and nontender. 

EXTREMITIES:  Positive edema. 

MUSCULOSKELETAL:  Decreased movements. 

NEUROLOGIC:  Alert and oriented x3.  Motor strength is 5/5 all over. 

SKIN:  The patient has bilateral lower extremities edema, erythema, maculopapular rash,

skin induration.  Right leg is worse than the left.  Skin excoriations. 

LABORATORY DATA:  WBC 8.1, hemoglobin 8.1, platelet count is 109.  Sodium 135, potassium

is 3.2, BUN is 34, creatinine is 1.7.  Ferritin is 235.  Urinalysis shows cloudy urine

with 6-10 rbc's and 11-20 wbc's.  Urine bacteria are many. 

 

IMAGING STUDIES:  Chest x-ray shows no acute cardiopulmonary disease.  EKG, sinus rhythm

with incomplete right bundle branch block. 

 

ASSESSMENT:  

1. Cellulitis of the right leg.

2. Urinary tract infection.

3. Obesity.

4. Hyperlipidemia.

5. Hypertension.

6. Diabetes mellitus.

7. Hypothyroidism.

8. Acute kidney injury.

 

PLAN:  We will check blood cultures with concerns of bacteremia.  We will also check

urine culture and ultrasound of the kidneys to check for possible pyelonephritis.  We

will continue the patient on IV Rocephin 1 g daily and IV clindamycin 600 mg q.8 hours.

I am concerned about peripheral venous disease and peripheral arterial disease.

Ultrasound of the lower extremities, venous doppler done is also pending.  We will check

bilateral lower extremity arterial dopplers.  The patient needs vascular surgery

evaluation.  Also concerned for lymphedema.  Further recommendations to follow. 

 

Thank you for letting me participate in the care of your patient.

 

 

 

 

______________________________

MD FREDERIC Rockwell/MODL

D:  03/01/2019 14:58:33

T:  03/01/2019 21:21:48

Job #:  [id number]

## 2019-03-02 NOTE — NUR
patient received awake, alert, lying quietly in bed. no c/o pain noted. pm assessment 
complete. patient instructed to call for assistance when needed.

## 2019-03-03 VITALS — DIASTOLIC BLOOD PRESSURE: 56 MMHG | SYSTOLIC BLOOD PRESSURE: 122 MMHG

## 2019-03-03 VITALS — SYSTOLIC BLOOD PRESSURE: 117 MMHG | DIASTOLIC BLOOD PRESSURE: 60 MMHG

## 2019-03-03 VITALS — DIASTOLIC BLOOD PRESSURE: 63 MMHG | SYSTOLIC BLOOD PRESSURE: 135 MMHG

## 2019-03-03 VITALS — DIASTOLIC BLOOD PRESSURE: 61 MMHG | SYSTOLIC BLOOD PRESSURE: 131 MMHG

## 2019-03-03 VITALS — SYSTOLIC BLOOD PRESSURE: 108 MMHG | DIASTOLIC BLOOD PRESSURE: 50 MMHG

## 2019-03-03 VITALS — DIASTOLIC BLOOD PRESSURE: 50 MMHG | SYSTOLIC BLOOD PRESSURE: 108 MMHG

## 2019-03-03 RX ADMIN — METFORMIN HYDROCHLORIDE SCH MG: 500 TABLET, EXTENDED RELEASE ORAL at 08:20

## 2019-03-03 RX ADMIN — CEFTRIAXONE SCH MLS/HR: 100 INJECTION, POWDER, FOR SOLUTION INTRAVENOUS at 13:00

## 2019-03-03 RX ADMIN — PIOGLITAZONE SCH MG: 15 TABLET ORAL at 08:20

## 2019-03-03 RX ADMIN — INSULIN LISPRO SCH UNIT: 100 INJECTION, SOLUTION INTRAVENOUS; SUBCUTANEOUS at 21:00

## 2019-03-03 RX ADMIN — CLINDAMYCIN PHOSPHATE SCH MLS/HR: 18 INJECTION, SOLUTION INTRAVENOUS at 17:58

## 2019-03-03 RX ADMIN — CLINDAMYCIN PHOSPHATE SCH MLS/HR: 18 INJECTION, SOLUTION INTRAVENOUS at 10:00

## 2019-03-03 RX ADMIN — LOSARTAN POTASSIUM SCH MG: 100 TABLET, FILM COATED ORAL at 08:20

## 2019-03-03 RX ADMIN — FENOFIBRATE SCH MG: 145 TABLET ORAL at 08:21

## 2019-03-03 RX ADMIN — CLINDAMYCIN PHOSPHATE SCH MLS/HR: 18 INJECTION, SOLUTION INTRAVENOUS at 01:22

## 2019-03-03 RX ADMIN — CEFTRIAXONE SCH MLS/HR: 100 INJECTION, POWDER, FOR SOLUTION INTRAVENOUS at 00:32

## 2019-03-03 RX ADMIN — LEVOTHYROXINE SODIUM SCH MCG: 50 TABLET ORAL at 05:30

## 2019-03-03 RX ADMIN — BICTEGRAVIR SODIUM, EMTRICITABINE, AND TENOFOVIR ALAFENAMIDE FUMARATE SCH: 50; 200; 25 TABLET ORAL at 08:21

## 2019-03-03 RX ADMIN — TRIAMCINOLONE ACETONIDE SCH GM: 1 CREAM TOPICAL at 09:00

## 2019-03-03 RX ADMIN — INSULIN LISPRO SCH UNIT: 100 INJECTION, SOLUTION INTRAVENOUS; SUBCUTANEOUS at 16:30

## 2019-03-03 RX ADMIN — INSULIN LISPRO SCH UNIT: 100 INJECTION, SOLUTION INTRAVENOUS; SUBCUTANEOUS at 07:30

## 2019-03-03 RX ADMIN — INSULIN LISPRO SCH UNIT: 100 INJECTION, SOLUTION INTRAVENOUS; SUBCUTANEOUS at 11:30

## 2019-03-03 RX ADMIN — ENOXAPARIN SODIUM SCH MG: 40 INJECTION SUBCUTANEOUS at 17:00

## 2019-03-03 RX ADMIN — LEVOTHYROXINE SODIUM SCH MCG: 125 TABLET ORAL at 05:30

## 2019-03-03 RX ADMIN — ATORVASTATIN CALCIUM SCH MG: 20 TABLET, FILM COATED ORAL at 20:13

## 2019-03-03 RX ADMIN — FUROSEMIDE SCH MG: 10 INJECTION, SOLUTION INTRAMUSCULAR; INTRAVENOUS at 08:20

## 2019-03-03 NOTE — NUR
patient oob to chair. patient states, " I just seem to rest better in the chair. " no c/o 
pain noted at this time.

## 2019-03-03 NOTE — NUR
patient received awake, alert, sitting up in chair with bilateral legs elevated. wraps to 
ble remain c,d,i. no c/o pain noted. pm assessment complete. patient instructed to call for 
assistance when needed.

## 2019-03-04 VITALS — DIASTOLIC BLOOD PRESSURE: 54 MMHG | SYSTOLIC BLOOD PRESSURE: 106 MMHG

## 2019-03-04 VITALS — SYSTOLIC BLOOD PRESSURE: 132 MMHG | DIASTOLIC BLOOD PRESSURE: 55 MMHG

## 2019-03-04 VITALS — DIASTOLIC BLOOD PRESSURE: 60 MMHG | SYSTOLIC BLOOD PRESSURE: 125 MMHG

## 2019-03-04 VITALS — SYSTOLIC BLOOD PRESSURE: 120 MMHG | DIASTOLIC BLOOD PRESSURE: 68 MMHG

## 2019-03-04 VITALS — DIASTOLIC BLOOD PRESSURE: 56 MMHG | SYSTOLIC BLOOD PRESSURE: 121 MMHG

## 2019-03-04 LAB
ANION GAP SERPL CALC-SCNC: 10.9 MMOL/L (ref 8–16)
BUN SERPL-MCNC: 20 MG/DL (ref 7–26)
BUN/CREAT SERPL: 17 (ref 6–25)
CALCIUM SERPL-MCNC: 9 MG/DL (ref 8.4–10.2)
CHLORIDE SERPL-SCNC: 105 MMOL/L (ref 98–107)
CO2 SERPL-SCNC: 26 MMOL/L (ref 22–29)
EGFRCR SERPLBLD CKD-EPI 2021: 46 ML/MIN (ref 60–?)
GLUCOSE SERPLBLD-MCNC: 89 MG/DL (ref 74–118)
POTASSIUM SERPL-SCNC: 3.9 MMOL/L (ref 3.5–5.1)
SODIUM SERPL-SCNC: 138 MMOL/L (ref 136–145)

## 2019-03-04 RX ADMIN — LEVOTHYROXINE SODIUM SCH MCG: 125 TABLET ORAL at 05:11

## 2019-03-04 RX ADMIN — CLINDAMYCIN PHOSPHATE SCH MLS/HR: 18 INJECTION, SOLUTION INTRAVENOUS at 02:00

## 2019-03-04 RX ADMIN — LEVOTHYROXINE SODIUM SCH MCG: 50 TABLET ORAL at 05:11

## 2019-03-04 RX ADMIN — FENOFIBRATE SCH MG: 145 TABLET ORAL at 09:13

## 2019-03-04 RX ADMIN — CLINDAMYCIN PHOSPHATE SCH MLS/HR: 18 INJECTION, SOLUTION INTRAVENOUS at 10:46

## 2019-03-04 RX ADMIN — CEFTRIAXONE SCH MLS/HR: 100 INJECTION, POWDER, FOR SOLUTION INTRAVENOUS at 13:26

## 2019-03-04 RX ADMIN — LOSARTAN POTASSIUM SCH MG: 100 TABLET, FILM COATED ORAL at 09:13

## 2019-03-04 RX ADMIN — PIOGLITAZONE SCH MG: 15 TABLET ORAL at 09:12

## 2019-03-04 RX ADMIN — INSULIN LISPRO SCH UNIT: 100 INJECTION, SOLUTION INTRAVENOUS; SUBCUTANEOUS at 11:30

## 2019-03-04 RX ADMIN — INSULIN LISPRO SCH UNIT: 100 INJECTION, SOLUTION INTRAVENOUS; SUBCUTANEOUS at 07:30

## 2019-03-04 RX ADMIN — FUROSEMIDE SCH MG: 10 INJECTION, SOLUTION INTRAMUSCULAR; INTRAVENOUS at 09:12

## 2019-03-04 RX ADMIN — INSULIN LISPRO SCH UNIT: 100 INJECTION, SOLUTION INTRAVENOUS; SUBCUTANEOUS at 16:30

## 2019-03-04 RX ADMIN — CEFTRIAXONE SCH MLS/HR: 100 INJECTION, POWDER, FOR SOLUTION INTRAVENOUS at 01:00

## 2019-03-04 RX ADMIN — ENOXAPARIN SODIUM SCH MG: 40 INJECTION SUBCUTANEOUS at 17:00

## 2019-03-04 RX ADMIN — TRIAMCINOLONE ACETONIDE SCH GM: 1 CREAM TOPICAL at 09:00

## 2019-03-04 RX ADMIN — BICTEGRAVIR SODIUM, EMTRICITABINE, AND TENOFOVIR ALAFENAMIDE FUMARATE SCH: 50; 200; 25 TABLET ORAL at 09:00

## 2019-03-04 NOTE — NUR
PATIENT DISCHARGED HOME. DISCHARGE INSTRUCTIONS, PRESCRIPTIONS, AND FOLLOW UP GIVEN TO 
PATIENT. SHE VERBALIZED UNDERSTANDING. IV TO LEFT AC REMOVED WITH TIP INTACT. ALL PERSONAL 
ITEMS TAKEN WITH PATIENT. LEFT UNIT PER WHEEL CHAIR TO FRONT LOBBY IN STABLE CONDITION.

## 2019-03-04 NOTE — NUR
WENT TO SPEAK TO PATIENT ABOUT HOME HEALTH. PT STATES SHE IS GOING TO DO OUT PATIENT WOUND 
CARE THROUGH DR AG OFFICE, SHE STATES SHE IS GOING TO RETURN TO WORK FULL TIME AT [employer]. SHE ALSO STATES THAT HER DAUGHTER IN LAW IS A NURSE AND IF SHE NEEDS 
SOMEONE TO CHANGE HER BANDAGES SHE WILL HAVE HER COME BY TO DO IT. PT REFUSING HOME HEALTH.

## 2019-03-04 NOTE — DISCHARGE SUMMARY
Patient of Dr. Luz, Dr. Pantoja, and Dr. Mcdaniel.

 

HOSPITAL COURSE:  Charming, but unfortunate 63-year-old diabetic, admitted with

progressive leg swelling, erythema, cellulitis.  History of diabetes, and hypertension.

Nonsmoker, nondrinker.  History of hyperlipidemia, and obesity.  Lives with her mother.

Treated with IV Cleocin and Rocephin.  She had moderate renal failure on admission.. She

was seen by Dr. Burch, thought she had stage 3 renal failure.  She was also seen by

Dr. Levy and Dr. Pantoja.  The patient improved and was discharged on Juxta

Lite lower leg compression stocking, Mepilex to the wounds three times a week, home

health assisting, Keflex 500 mg four times day #30, metformin was discontinued,

levothyroxine 175 mcg a day, aspirin 81, Lipitor 20, fenofibrate 145, losartan 100, and

Actos 30 mg once a day. 

 

 

 

 

______________________________

MD JANIE Currie/MODL

D:  03/04/2019 09:50:42

T:  03/04/2019 18:48:29

Job #:  [id number]

## 2019-03-04 NOTE — NUR
PATIENT REFUSED THE COBAN TO BE REMOVED FROM LOWER EXTREMITIES. IN RECLINER CHAIR WITH CALL 
LIGHT AT REACH.

## 2019-03-04 NOTE — NUR
PATIENT OUT OF BED TO RECLINER, NO DISTRESS NOTED. COBAN TO BOTH LEG, WITH SOME SWELLING 
NOTED. C/O COLD, WARM BLANKET PROVIDED. CALL LIGHT AT REACH.

## 2020-11-05 LAB
ANION GAP SERPL CALC-SCNC: 14.9 MMOL/L (ref 8–16)
BASOPHILS # BLD AUTO: 0.1 10*3/UL (ref 0–0.1)
BASOPHILS NFR BLD AUTO: 1.5 % (ref 0–1)
BUN SERPL-MCNC: 24 MG/DL (ref 7–26)
BUN/CREAT SERPL: 21 (ref 6–25)
CALCIUM SERPL-MCNC: 9.6 MG/DL (ref 8.4–10.2)
CHLORIDE SERPL-SCNC: 100 MMOL/L (ref 98–107)
CO2 SERPL-SCNC: 24 MMOL/L (ref 22–29)
DEPRECATED NEUTROPHILS # BLD AUTO: 2.7 10*3/UL (ref 2.1–6.9)
EGFRCR SERPLBLD CKD-EPI 2021: 46 ML/MIN (ref 60–?)
EOSINOPHIL # BLD AUTO: 0.2 10*3/UL (ref 0–0.4)
EOSINOPHIL NFR BLD AUTO: 3.4 % (ref 0–6)
ERYTHROCYTE [DISTWIDTH] IN CORD BLOOD: 14.9 % (ref 11.7–14.4)
GLUCOSE SERPLBLD-MCNC: 331 MG/DL (ref 74–118)
HCT VFR BLD AUTO: 39.1 % (ref 34.2–44.1)
HGB BLD-MCNC: 12.9 G/DL (ref 12–16)
LYMPHOCYTES # BLD: 1.2 10*3/UL (ref 1–3.2)
LYMPHOCYTES NFR BLD AUTO: 25.5 % (ref 18–39.1)
MCH RBC QN AUTO: 29 PG (ref 28–32)
MCHC RBC AUTO-ENTMCNC: 33 G/DL (ref 31–35)
MCV RBC AUTO: 87.9 FL (ref 81–99)
MONOCYTES # BLD AUTO: 0.6 10*3/UL (ref 0.2–0.8)
MONOCYTES NFR BLD AUTO: 11.7 % (ref 4.4–11.3)
NEUTS SEG NFR BLD AUTO: 57.5 % (ref 38.7–80)
PLATELET # BLD AUTO: 118 X10E3/UL (ref 140–360)
POTASSIUM SERPL-SCNC: 3.9 MMOL/L (ref 3.5–5.1)
RBC # BLD AUTO: 4.45 X10E6/UL (ref 3.6–5.1)
SODIUM SERPL-SCNC: 135 MMOL/L (ref 136–145)

## 2020-11-09 ENCOUNTER — HOSPITAL ENCOUNTER (INPATIENT)
Dept: HOSPITAL 88 - OR | Age: 65
LOS: 3 days | Discharge: HOME | DRG: 620 | End: 2020-11-12
Attending: SURGERY | Admitting: SURGERY
Payer: MEDICARE

## 2020-11-09 VITALS — DIASTOLIC BLOOD PRESSURE: 42 MMHG | SYSTOLIC BLOOD PRESSURE: 96 MMHG

## 2020-11-09 VITALS — DIASTOLIC BLOOD PRESSURE: 69 MMHG | SYSTOLIC BLOOD PRESSURE: 91 MMHG

## 2020-11-09 VITALS — SYSTOLIC BLOOD PRESSURE: 148 MMHG | DIASTOLIC BLOOD PRESSURE: 74 MMHG

## 2020-11-09 VITALS — SYSTOLIC BLOOD PRESSURE: 137 MMHG | DIASTOLIC BLOOD PRESSURE: 71 MMHG

## 2020-11-09 VITALS — BODY MASS INDEX: 50.02 KG/M2 | WEIGHT: 293 LBS | HEIGHT: 64 IN

## 2020-11-09 DIAGNOSIS — E11.22: ICD-10-CM

## 2020-11-09 DIAGNOSIS — I50.812: ICD-10-CM

## 2020-11-09 DIAGNOSIS — E66.01: Primary | ICD-10-CM

## 2020-11-09 DIAGNOSIS — Z79.899: ICD-10-CM

## 2020-11-09 DIAGNOSIS — K76.0: ICD-10-CM

## 2020-11-09 DIAGNOSIS — E11.9: ICD-10-CM

## 2020-11-09 DIAGNOSIS — N17.9: ICD-10-CM

## 2020-11-09 DIAGNOSIS — N18.30: ICD-10-CM

## 2020-11-09 DIAGNOSIS — Z11.59: ICD-10-CM

## 2020-11-09 DIAGNOSIS — I50.814: ICD-10-CM

## 2020-11-09 DIAGNOSIS — I13.0: ICD-10-CM

## 2020-11-09 DIAGNOSIS — E78.5: ICD-10-CM

## 2020-11-09 DIAGNOSIS — D50.9: ICD-10-CM

## 2020-11-09 PROCEDURE — 84100 ASSAY OF PHOSPHORUS: CPT

## 2020-11-09 PROCEDURE — 85025 COMPLETE CBC W/AUTO DIFF WBC: CPT

## 2020-11-09 PROCEDURE — 71046 X-RAY EXAM CHEST 2 VIEWS: CPT

## 2020-11-09 PROCEDURE — 36415 COLL VENOUS BLD VENIPUNCTURE: CPT

## 2020-11-09 PROCEDURE — 82948 REAGENT STRIP/BLOOD GLUCOSE: CPT

## 2020-11-09 PROCEDURE — 76770 US EXAM ABDO BACK WALL COMP: CPT

## 2020-11-09 PROCEDURE — 0DB64Z3 EXCISION OF STOMACH, PERCUTANEOUS ENDOSCOPIC APPROACH, VERTICAL: ICD-10-PCS | Performed by: SURGERY

## 2020-11-09 PROCEDURE — 81001 URINALYSIS AUTO W/SCOPE: CPT

## 2020-11-09 PROCEDURE — 96361 HYDRATE IV INFUSION ADD-ON: CPT

## 2020-11-09 PROCEDURE — 80048 BASIC METABOLIC PNL TOTAL CA: CPT

## 2020-11-09 PROCEDURE — 93005 ELECTROCARDIOGRAM TRACING: CPT

## 2020-11-09 PROCEDURE — 80053 COMPREHEN METABOLIC PANEL: CPT

## 2020-11-09 PROCEDURE — 83735 ASSAY OF MAGNESIUM: CPT

## 2020-11-09 RX ADMIN — SCOPALAMINE SCH MG: 1 PATCH, EXTENDED RELEASE TRANSDERMAL at 09:00

## 2020-11-09 RX ADMIN — INSULIN LISPRO SCH UNIT: 100 INJECTION, SOLUTION INTRAVENOUS; SUBCUTANEOUS at 16:00

## 2020-11-09 RX ADMIN — ENOXAPARIN SODIUM SCH MG: 40 INJECTION SUBCUTANEOUS at 22:05

## 2020-11-09 RX ADMIN — SODIUM CHLORIDE, POTASSIUM CHLORIDE, SODIUM LACTATE AND CALCIUM CHLORIDE SCH MLS/HR: 600; 310; 30; 20 INJECTION, SOLUTION INTRAVENOUS at 16:22

## 2020-11-10 VITALS — DIASTOLIC BLOOD PRESSURE: 56 MMHG | SYSTOLIC BLOOD PRESSURE: 105 MMHG

## 2020-11-10 VITALS — DIASTOLIC BLOOD PRESSURE: 91 MMHG | SYSTOLIC BLOOD PRESSURE: 129 MMHG

## 2020-11-10 VITALS — SYSTOLIC BLOOD PRESSURE: 107 MMHG | DIASTOLIC BLOOD PRESSURE: 39 MMHG

## 2020-11-10 VITALS — SYSTOLIC BLOOD PRESSURE: 99 MMHG | DIASTOLIC BLOOD PRESSURE: 52 MMHG

## 2020-11-10 VITALS — SYSTOLIC BLOOD PRESSURE: 114 MMHG | DIASTOLIC BLOOD PRESSURE: 55 MMHG

## 2020-11-10 LAB
ALBUMIN SERPL-MCNC: 3 G/DL (ref 3.5–5)
ALBUMIN/GLOB SERPL: 0.9 {RATIO} (ref 0.8–2)
ALP SERPL-CCNC: 43 IU/L (ref 40–150)
ALT SERPL-CCNC: 36 IU/L (ref 0–55)
ANION GAP SERPL CALC-SCNC: 13.1 MMOL/L (ref 8–16)
BASOPHILS # BLD AUTO: 0 10*3/UL (ref 0–0.1)
BASOPHILS # BLD AUTO: 0 10*3/UL (ref 0–0.1)
BASOPHILS NFR BLD AUTO: 0.2 % (ref 0–1)
BASOPHILS NFR BLD AUTO: 0.4 % (ref 0–1)
BUN SERPL-MCNC: 35 MG/DL (ref 7–26)
BUN/CREAT SERPL: 13 (ref 6–25)
CALCIUM SERPL-MCNC: 8.9 MG/DL (ref 8.4–10.2)
CHLORIDE SERPL-SCNC: 102 MMOL/L (ref 98–107)
CO2 SERPL-SCNC: 23 MMOL/L (ref 22–29)
DEPRECATED NEUTROPHILS # BLD AUTO: 10.6 10*3/UL (ref 2.1–6.9)
DEPRECATED NEUTROPHILS # BLD AUTO: 8 10*3/UL (ref 2.1–6.9)
DEPRECATED PHOSPHATE SERPL-MCNC: 4 MG/DL (ref 2.3–4.7)
EGFRCR SERPLBLD CKD-EPI 2021: 18 ML/MIN (ref 60–?)
EOSINOPHIL # BLD AUTO: 0 10*3/UL (ref 0–0.4)
EOSINOPHIL # BLD AUTO: 0 10*3/UL (ref 0–0.4)
EOSINOPHIL NFR BLD AUTO: 0 % (ref 0–6)
EOSINOPHIL NFR BLD AUTO: 0 % (ref 0–6)
ERYTHROCYTE [DISTWIDTH] IN CORD BLOOD: 15.6 % (ref 11.7–14.4)
ERYTHROCYTE [DISTWIDTH] IN CORD BLOOD: 15.6 % (ref 11.7–14.4)
GLOBULIN PLAS-MCNC: 3.2 G/DL (ref 2.3–3.5)
GLUCOSE SERPLBLD-MCNC: 199 MG/DL (ref 74–118)
HCT VFR BLD AUTO: 25 % (ref 34.2–44.1)
HCT VFR BLD AUTO: 27.9 % (ref 34.2–44.1)
HGB BLD-MCNC: 8.4 G/DL (ref 12–16)
HGB BLD-MCNC: 9.2 G/DL (ref 12–16)
LYMPHOCYTES # BLD: 1.8 10*3/UL (ref 1–3.2)
LYMPHOCYTES # BLD: 1.9 10*3/UL (ref 1–3.2)
LYMPHOCYTES NFR BLD AUTO: 13.1 % (ref 18–39.1)
LYMPHOCYTES NFR BLD AUTO: 15.7 % (ref 18–39.1)
MAGNESIUM SERPL-MCNC: 1.7 MG/DL (ref 1.3–2.1)
MCH RBC QN AUTO: 29.3 PG (ref 28–32)
MCH RBC QN AUTO: 29.9 PG (ref 28–32)
MCHC RBC AUTO-ENTMCNC: 33 G/DL (ref 31–35)
MCHC RBC AUTO-ENTMCNC: 33.6 G/DL (ref 31–35)
MCV RBC AUTO: 88.9 FL (ref 81–99)
MCV RBC AUTO: 89 FL (ref 81–99)
MONOCYTES # BLD AUTO: 1.3 10*3/UL (ref 0.2–0.8)
MONOCYTES # BLD AUTO: 1.6 10*3/UL (ref 0.2–0.8)
MONOCYTES NFR BLD AUTO: 11.5 % (ref 4.4–11.3)
MONOCYTES NFR BLD AUTO: 11.6 % (ref 4.4–11.3)
NEUTS SEG NFR BLD AUTO: 72 % (ref 38.7–80)
NEUTS SEG NFR BLD AUTO: 74.6 % (ref 38.7–80)
PLATELET # BLD AUTO: 144 X10E3/UL (ref 140–360)
PLATELET # BLD AUTO: 188 X10E3/UL (ref 140–360)
POTASSIUM SERPL-SCNC: 4.1 MMOL/L (ref 3.5–5.1)
RBC # BLD AUTO: 2.81 X10E6/UL (ref 3.6–5.1)
RBC # BLD AUTO: 3.14 X10E6/UL (ref 3.6–5.1)
SODIUM SERPL-SCNC: 134 MMOL/L (ref 136–145)

## 2020-11-10 RX ADMIN — SODIUM CHLORIDE, POTASSIUM CHLORIDE, SODIUM LACTATE AND CALCIUM CHLORIDE SCH MLS/HR: 600; 310; 30; 20 INJECTION, SOLUTION INTRAVENOUS at 08:08

## 2020-11-10 RX ADMIN — INSULIN LISPRO SCH UNIT: 100 INJECTION, SOLUTION INTRAVENOUS; SUBCUTANEOUS at 12:14

## 2020-11-10 RX ADMIN — ENOXAPARIN SODIUM SCH MG: 40 INJECTION SUBCUTANEOUS at 08:08

## 2020-11-10 RX ADMIN — INSULIN LISPRO SCH UNIT: 100 INJECTION, SOLUTION INTRAVENOUS; SUBCUTANEOUS at 08:18

## 2020-11-10 RX ADMIN — SODIUM CHLORIDE, POTASSIUM CHLORIDE, SODIUM LACTATE AND CALCIUM CHLORIDE SCH MLS/HR: 600; 310; 30; 20 INJECTION, SOLUTION INTRAVENOUS at 10:00

## 2020-11-10 RX ADMIN — SODIUM CHLORIDE, POTASSIUM CHLORIDE, SODIUM LACTATE AND CALCIUM CHLORIDE SCH MLS/HR: 600; 310; 30; 20 INJECTION, SOLUTION INTRAVENOUS at 04:08

## 2020-11-10 RX ADMIN — INSULIN LISPRO SCH UNIT: 100 INJECTION, SOLUTION INTRAVENOUS; SUBCUTANEOUS at 17:07

## 2020-11-10 RX ADMIN — INSULIN LISPRO SCH UNIT: 100 INJECTION, SOLUTION INTRAVENOUS; SUBCUTANEOUS at 21:00

## 2020-11-11 VITALS — SYSTOLIC BLOOD PRESSURE: 113 MMHG | DIASTOLIC BLOOD PRESSURE: 60 MMHG

## 2020-11-11 VITALS — DIASTOLIC BLOOD PRESSURE: 62 MMHG | SYSTOLIC BLOOD PRESSURE: 132 MMHG

## 2020-11-11 VITALS — SYSTOLIC BLOOD PRESSURE: 120 MMHG | DIASTOLIC BLOOD PRESSURE: 63 MMHG

## 2020-11-11 VITALS — DIASTOLIC BLOOD PRESSURE: 71 MMHG | SYSTOLIC BLOOD PRESSURE: 100 MMHG

## 2020-11-11 VITALS — SYSTOLIC BLOOD PRESSURE: 110 MMHG | DIASTOLIC BLOOD PRESSURE: 58 MMHG

## 2020-11-11 VITALS — SYSTOLIC BLOOD PRESSURE: 132 MMHG | DIASTOLIC BLOOD PRESSURE: 68 MMHG

## 2020-11-11 VITALS — DIASTOLIC BLOOD PRESSURE: 68 MMHG | SYSTOLIC BLOOD PRESSURE: 132 MMHG

## 2020-11-11 LAB
ALBUMIN SERPL-MCNC: 3.3 G/DL (ref 3.5–5)
ALBUMIN/GLOB SERPL: 0.8 {RATIO} (ref 0.8–2)
ALP SERPL-CCNC: 52 IU/L (ref 40–150)
ALT SERPL-CCNC: 28 IU/L (ref 0–55)
ANION GAP SERPL CALC-SCNC: 13.5 MMOL/L (ref 8–16)
BACTERIA URNS QL MICRO: (no result) /HPF
BASOPHILS # BLD AUTO: 0 10*3/UL (ref 0–0.1)
BASOPHILS # BLD AUTO: 0 10*3/UL (ref 0–0.1)
BASOPHILS NFR BLD AUTO: 0.4 % (ref 0–1)
BASOPHILS NFR BLD AUTO: 0.5 % (ref 0–1)
BILIRUB UR QL: NEGATIVE
BUN SERPL-MCNC: 27 MG/DL (ref 7–26)
BUN/CREAT SERPL: 23 (ref 6–25)
CALCIUM SERPL-MCNC: 9.1 MG/DL (ref 8.4–10.2)
CHLORIDE SERPL-SCNC: 102 MMOL/L (ref 98–107)
CLARITY UR: (no result)
CO2 SERPL-SCNC: 24 MMOL/L (ref 22–29)
COLOR UR: YELLOW
DEPRECATED NEUTROPHILS # BLD AUTO: 4.6 10*3/UL (ref 2.1–6.9)
DEPRECATED NEUTROPHILS # BLD AUTO: 5.2 10*3/UL (ref 2.1–6.9)
DEPRECATED RBC URNS MANUAL-ACNC: (no result) /HPF (ref 0–5)
EGFRCR SERPLBLD CKD-EPI 2021: 47 ML/MIN (ref 60–?)
EOSINOPHIL # BLD AUTO: 0 10*3/UL (ref 0–0.4)
EOSINOPHIL # BLD AUTO: 0 10*3/UL (ref 0–0.4)
EOSINOPHIL NFR BLD AUTO: 0.2 % (ref 0–6)
EOSINOPHIL NFR BLD AUTO: 0.3 % (ref 0–6)
EPI CELLS URNS QL MICRO: (no result) /LPF
ERYTHROCYTE [DISTWIDTH] IN CORD BLOOD: 15.6 % (ref 11.7–14.4)
ERYTHROCYTE [DISTWIDTH] IN CORD BLOOD: 15.8 % (ref 11.7–14.4)
GLOBULIN PLAS-MCNC: 3.9 G/DL (ref 2.3–3.5)
GLUCOSE SERPLBLD-MCNC: 114 MG/DL (ref 74–118)
HCT VFR BLD AUTO: 24.2 % (ref 34.2–44.1)
HCT VFR BLD AUTO: 26.2 % (ref 34.2–44.1)
HGB BLD-MCNC: 7.9 G/DL (ref 12–16)
HGB BLD-MCNC: 8.5 G/DL (ref 12–16)
KETONES UR QL STRIP.AUTO: NEGATIVE
LEUKOCYTE ESTERASE UR QL STRIP.AUTO: (no result)
LYMPHOCYTES # BLD: 1.8 10*3/UL (ref 1–3.2)
LYMPHOCYTES # BLD: 1.8 10*3/UL (ref 1–3.2)
LYMPHOCYTES NFR BLD AUTO: 21.7 % (ref 18–39.1)
LYMPHOCYTES NFR BLD AUTO: 23.9 % (ref 18–39.1)
MCH RBC QN AUTO: 29.4 PG (ref 28–32)
MCH RBC QN AUTO: 30 PG (ref 28–32)
MCHC RBC AUTO-ENTMCNC: 32.4 G/DL (ref 31–35)
MCHC RBC AUTO-ENTMCNC: 32.6 G/DL (ref 31–35)
MCV RBC AUTO: 90.7 FL (ref 81–99)
MCV RBC AUTO: 92 FL (ref 81–99)
MONOCYTES # BLD AUTO: 0.9 10*3/UL (ref 0.2–0.8)
MONOCYTES # BLD AUTO: 1 10*3/UL (ref 0.2–0.8)
MONOCYTES NFR BLD AUTO: 12 % (ref 4.4–11.3)
MONOCYTES NFR BLD AUTO: 12.6 % (ref 4.4–11.3)
NEUTS SEG NFR BLD AUTO: 62.9 % (ref 38.7–80)
NEUTS SEG NFR BLD AUTO: 64.5 % (ref 38.7–80)
NITRITE UR QL STRIP.AUTO: NEGATIVE
NON-SQ EPI CELLS URNS QL MICRO: (no result)
PLATELET # BLD AUTO: 115 X10E3/UL (ref 140–360)
PLATELET # BLD AUTO: 117 X10E3/UL (ref 140–360)
POTASSIUM SERPL-SCNC: 4.5 MMOL/L (ref 3.5–5.1)
PROT UR QL STRIP.AUTO: NEGATIVE
RBC # BLD AUTO: 2.63 X10E6/UL (ref 3.6–5.1)
RBC # BLD AUTO: 2.89 X10E6/UL (ref 3.6–5.1)
SODIUM SERPL-SCNC: 135 MMOL/L (ref 136–145)
SP GR UR STRIP: 1.01 (ref 1.01–1.02)
UROBILINOGEN UR STRIP-MCNC: 0.2 MG/DL (ref 0.2–1)
WBC #/AREA URNS HPF: (no result) /HPF (ref 0–5)

## 2020-11-11 RX ADMIN — SODIUM CHLORIDE, POTASSIUM CHLORIDE, SODIUM LACTATE AND CALCIUM CHLORIDE SCH MLS/HR: 600; 310; 30; 20 INJECTION, SOLUTION INTRAVENOUS at 15:45

## 2020-11-11 RX ADMIN — INSULIN LISPRO SCH UNIT: 100 INJECTION, SOLUTION INTRAVENOUS; SUBCUTANEOUS at 20:53

## 2020-11-11 RX ADMIN — INSULIN LISPRO SCH UNIT: 100 INJECTION, SOLUTION INTRAVENOUS; SUBCUTANEOUS at 07:30

## 2020-11-11 RX ADMIN — INSULIN LISPRO SCH UNIT: 100 INJECTION, SOLUTION INTRAVENOUS; SUBCUTANEOUS at 16:06

## 2020-11-11 RX ADMIN — INSULIN LISPRO SCH UNIT: 100 INJECTION, SOLUTION INTRAVENOUS; SUBCUTANEOUS at 11:23

## 2020-11-12 VITALS — SYSTOLIC BLOOD PRESSURE: 120 MMHG | DIASTOLIC BLOOD PRESSURE: 50 MMHG

## 2020-11-12 VITALS — SYSTOLIC BLOOD PRESSURE: 107 MMHG | DIASTOLIC BLOOD PRESSURE: 51 MMHG

## 2020-11-12 LAB
BASOPHILS # BLD AUTO: 0 10*3/UL (ref 0–0.1)
BASOPHILS NFR BLD AUTO: 0.6 % (ref 0–1)
DEPRECATED NEUTROPHILS # BLD AUTO: 2.9 10*3/UL (ref 2.1–6.9)
EOSINOPHIL # BLD AUTO: 0.1 10*3/UL (ref 0–0.4)
EOSINOPHIL NFR BLD AUTO: 1.3 % (ref 0–6)
ERYTHROCYTE [DISTWIDTH] IN CORD BLOOD: 15.8 % (ref 11.7–14.4)
HCT VFR BLD AUTO: 24.9 % (ref 34.2–44.1)
HGB BLD-MCNC: 8 G/DL (ref 12–16)
LYMPHOCYTES # BLD: 1.5 10*3/UL (ref 1–3.2)
LYMPHOCYTES NFR BLD AUTO: 29.3 % (ref 18–39.1)
MCH RBC QN AUTO: 30.3 PG (ref 28–32)
MCHC RBC AUTO-ENTMCNC: 32.1 G/DL (ref 31–35)
MCV RBC AUTO: 94.3 FL (ref 81–99)
MONOCYTES # BLD AUTO: 0.7 10*3/UL (ref 0.2–0.8)
MONOCYTES NFR BLD AUTO: 13.5 % (ref 4.4–11.3)
NEUTS SEG NFR BLD AUTO: 54.5 % (ref 38.7–80)
PLATELET # BLD AUTO: 107 X10E3/UL (ref 140–360)
RBC # BLD AUTO: 2.64 X10E6/UL (ref 3.6–5.1)

## 2020-11-12 RX ADMIN — SODIUM CHLORIDE, POTASSIUM CHLORIDE, SODIUM LACTATE AND CALCIUM CHLORIDE SCH MLS/HR: 600; 310; 30; 20 INJECTION, SOLUTION INTRAVENOUS at 01:23

## 2020-11-12 RX ADMIN — SCOPALAMINE SCH MG: 1 PATCH, EXTENDED RELEASE TRANSDERMAL at 09:00

## 2020-11-12 RX ADMIN — INSULIN LISPRO SCH UNIT: 100 INJECTION, SOLUTION INTRAVENOUS; SUBCUTANEOUS at 07:30
